# Patient Record
Sex: MALE | Race: WHITE | NOT HISPANIC OR LATINO | Employment: UNEMPLOYED | URBAN - METROPOLITAN AREA
[De-identification: names, ages, dates, MRNs, and addresses within clinical notes are randomized per-mention and may not be internally consistent; named-entity substitution may affect disease eponyms.]

---

## 2017-01-18 DIAGNOSIS — F64.0 GENDER DYSPHORIA IN ADULT: Primary | ICD-10-CM

## 2017-01-18 RX ORDER — CEFAZOLIN SODIUM 1 G/3ML
1 INJECTION, POWDER, FOR SOLUTION INTRAMUSCULAR; INTRAVENOUS SEE ADMIN INSTRUCTIONS
Status: CANCELLED | OUTPATIENT
Start: 2017-01-18

## 2017-01-19 ENCOUNTER — PRE VISIT (OUTPATIENT)
Dept: UROLOGY | Facility: CLINIC | Age: 27
End: 2017-01-19

## 2017-01-19 NOTE — TELEPHONE ENCOUNTER
Patient coming in for orchiectomy consult per appointment notes. Left message with patient to call clinic to confirm

## 2017-01-26 ENCOUNTER — ALLIED HEALTH/NURSE VISIT (OUTPATIENT)
Dept: UROLOGY | Facility: CLINIC | Age: 27
End: 2017-01-26

## 2017-01-26 ENCOUNTER — OFFICE VISIT (OUTPATIENT)
Dept: UROLOGY | Facility: CLINIC | Age: 27
End: 2017-01-26

## 2017-01-26 VITALS
HEIGHT: 72 IN | WEIGHT: 153.2 LBS | BODY MASS INDEX: 20.75 KG/M2 | HEART RATE: 67 BPM | DIASTOLIC BLOOD PRESSURE: 66 MMHG | SYSTOLIC BLOOD PRESSURE: 118 MMHG

## 2017-01-26 DIAGNOSIS — F64.0 GENDER DYSPHORIA IN ADOLESCENT AND ADULT: Primary | ICD-10-CM

## 2017-01-26 RX ORDER — CEFAZOLIN SODIUM 1 G/3ML
1 INJECTION, POWDER, FOR SOLUTION INTRAMUSCULAR; INTRAVENOUS SEE ADMIN INSTRUCTIONS
Status: CANCELLED | OUTPATIENT
Start: 2017-01-26

## 2017-01-26 ASSESSMENT — PAIN SCALES - GENERAL: PAINLEVEL: NO PAIN (0)

## 2017-01-26 NOTE — Clinical Note
"1/26/2017       RE: Rich Castro  [address] [place] [id number]     Dear Colleague,    Thank you for referring your patient, Rich Castro, to the [facility] UROLOGY AND [facility] FOR PROST[facility] AND UROLOGIC CANCERS at [facility]. Please see a copy of my visit note below.    It is our pleasure to see Rich \"Edilma\"Matthew back in Urology Clinic today.  She is a very nice 26-year-old male-to-female transgender here to discuss bilateral transgender orchiectomy for gender confirmation.      I last saw her in 10/2015.  She was initiating transitioning back then and when by Yvan.  Now she calls herself Edilma.  We have 2 letters in support of gender confirmation surgery, and we just need to get this procedure scheduled.        She has banked sperm prior to initiating hormone therapy.  She has been living as a female for well over a year and compliant with hormones for greater than 1 year's time as well.      PLAN:  We discussed the process of surgery, the incisions, the expected recovery and such.  All questions were answered to her satisfaction.  She is very excited to get this set up as soon as possible.  We will work on getting this arranged at her convenience.  She declines a scrotectomy.  She already has sperm banked.  I cannot think of any other tests or things that we would need to accomplish before scheduling the surgery.  We discussed with her that we will be supportive for her in getting this covered by insurance as well.      Total visit time today was 20 minutes, over half of that was face-to-face in counseling and discussion and discussing the protocol and procedure of transgender orchiectomies.           Again, thank you for allowing me to participate in the care of your patient.      Sincerely,    Alcides Martínez MD      "

## 2017-01-26 NOTE — PROGRESS NOTES
"It is our pleasure to see Rich \"Edilma\" Matthew back in Urology Clinic today.  She is a very nice 26-year-old male-to-female transgender here to discuss bilateral transgender orchiectomy for gender confirmation.      I last saw her in 10/2015.  She was initiating transitioning back then and when by Yvan.  Now she calls herself Edilma.  We have 2 letters in support of gender confirmation surgery, and we just need to get this procedure scheduled.        She has banked sperm prior to initiating hormone therapy.  She has been living as a female for well over a year and compliant with hormones for greater than 1 year's time as well.      PLAN:  We discussed the process of surgery, the incisions, the expected recovery and such.  All questions were answered to her satisfaction.  She is very excited to get this set up as soon as possible.  We will work on getting this arranged at her convenience.  She declines a scrotectomy.  She already has sperm banked.  I cannot think of any other tests or things that we would need to accomplish before scheduling the surgery.  We discussed with her that we will be supportive for her in getting this covered by insurance as well.      Total visit time today was 20 minutes, over half of that was face-to-face in counseling and discussion and discussing the protocol and procedure of transgender orchiectomies.       "

## 2017-01-26 NOTE — Clinical Note
Date:January 27, 2017      Provider requested that no letter be sent. Do not send.       [facility] Health Information

## 2017-01-27 NOTE — NURSING NOTE
Pre Op Teaching Flowsheet       Pre and Post op Patient Education  Relevant Diagnosis:  Bilateral transgender orchiectomy, subinguinal  Surgical procedure:  Male to female transgender  Teaching Topic:  Pre and post op teaching  Person Involved in teaching: Rich Castro    Motivation Level:  Asks Questions: Yes  Eager to Learn:  Yes  Cooperative: Yes  Receptive (willing/able to accept information):  Yes    Patient demonstrates understanding of the following:  Date of surgery:  3/7/17  Location of surgery:  [facility]- 5th Floor  History and Physical and any other testing necessary prior to surgery: Yes  Required time line for completion of History and Physical and any pre-op testing: Yes    Patient demonstrates understanding of the following:  Pre-op bowel prep:  N/A  Pre-op showering/scrub information with PCMX Soap: Yes  Blood thinner medications discussed and when to stop (if applicable):  N/A      Infection Prevention:   Patient demonstrates understanding of the following:  Surgical procedure site care taught: Yes  Signs and symptoms of infection taught:  Yes      Post-op follow-up:  Discussed how to contact the hospital, nurse, and clinic scheduling staff if necessary.    Instructional materials used/given/mailed:  [facility] Booklet, post op teaching sheet, Map, Soap, and arrival/location information.    Surgical instructions packet given to patient in office:  Yes.

## 2017-02-07 DIAGNOSIS — F64.0 GENDER IDENTITY DISORDER IN ADOLESCENTS OR ADULTS: Primary | ICD-10-CM

## 2017-02-07 RX ORDER — SPIRONOLACTONE 100 MG/1
200 TABLET, FILM COATED ORAL DAILY
Qty: 60 TABLET | Refills: 1 | Status: SHIPPED | OUTPATIENT
Start: 2017-02-07 | End: 2017-06-06

## 2017-02-09 ENCOUNTER — OFFICE VISIT (OUTPATIENT)
Dept: OTHER | Facility: OUTPATIENT CENTER | Age: 27
End: 2017-02-09

## 2017-02-09 DIAGNOSIS — F12.11 CANNABIS USE DISORDER, MILD, IN EARLY REMISSION: ICD-10-CM

## 2017-02-09 DIAGNOSIS — F64.0 GENDER DYSPHORIA IN ADOLESCENT AND ADULT: Primary | ICD-10-CM

## 2017-02-09 DIAGNOSIS — F41.1 GENERALIZED ANXIETY DISORDER: ICD-10-CM

## 2017-02-09 NOTE — PROGRESS NOTES
Program in [facility]  [facility]  [address], [address]  [place] [address]   Case Progress Note      Client Name: Rich Castro  Preferred Name: Edilma, Preferred pronouns: She, her  YOB: 1990  MRN: [id number]  Type of Session: Individual  Treating Provider: Harpreet Way Psy.D., LP  Present in Session: Client   Number of Minutes: 55  Date of Service: 2/09/17    Current Symptoms/Status:  The patient is a birth assigned.male who reports a history of gender dysphoria and persistent identification as female. She also endorsed current anxiety symptoms such as racing thoughts, fear, and chronic worry.  The client also endorsed daily marijuana use, which she stated that she has reduced in last week.  The client also regulalry exhibits deficits with processing speed, comprehension, focus, attention, and memory during sessions.     Progress Toward Treatment Goals:   Client reported positive interactions with peers at work.  Client has orchiectomy surgery scheduled.  Client has started laser hair removal.    Intervention: Modality and Description/ Response:  Utilized CBT, solution-focused, psychodynamic techniques to discuss gender dysphoria and related mental health symptoms.  Client has a date scheduled for her orchiectomy (March 7th with Dr. Mack).  Explored and identified client's feelings about this.  Discussed how client wants to handle this with work, as she has not yet taken time off or informed her boss that she won't be able to lift things for at least a week.  Identified a plan for assertively communicating about time off with her work.  Client indicated that she has mostly been working lately.  She reported that she has not been spending much time with others as a result.  Client stated that she does not want to come to  YA group anymore, as she works a lot and does not find group helpful.  Explored her difficulties with group and identified alternative ways for  client to receive trans* support.  Client reported that she still hasn't spoke with her father, who is continuing to avoid using any name or pronouns.  She reported that she still feels anxious about speaking with him.  Continued to discuss the potential benefits of client's father coming in for session.  Discussed some of client's concerns about her eating habits.  She stated that she doesn't have much of an appetite, as she often does not feel motivated to eat.  Explored how this may intersect with depressive symptoms or low mood.  Client reported that she has also been sleeping erratically.  Continued to discuss ways in which client could regulate her sleep cycle.    Identified some activities that client could do in her free time rather than sleep.   Client was more engaged than in previous sessions and exhibited improvement in tracking information.      Assignment:  Client will speak with HR at work about time off for surgery.  She will call a local drum shop to inquire about lessons.      Diagnosis:  302.85 - Gender Dysphoria in adolescents or adults  300. 02   - Generalized Anxiety Disorder  305.20- Cannabis Use Disorder, Mild in early remission    Interactive Complexity  None     Need for Future Services:  Return for therapy in 2 weeks.     Harpreet Way Psy.D., LP  Postdoctoral Fellow

## 2017-02-09 NOTE — PROGRESS NOTES
I did not personally see the patient.  I reviewed and agree with the assessment and plan as documented in this note. Jeannie Soares, PhD, LP

## 2017-03-06 ENCOUNTER — ANESTHESIA EVENT (OUTPATIENT)
Dept: SURGERY | Facility: AMBULATORY SURGERY CENTER | Age: 27
End: 2017-03-06

## 2017-03-07 ENCOUNTER — ANESTHESIA (OUTPATIENT)
Dept: SURGERY | Facility: AMBULATORY SURGERY CENTER | Age: 27
End: 2017-03-07

## 2017-03-07 ENCOUNTER — HOSPITAL ENCOUNTER (OUTPATIENT)
Facility: AMBULATORY SURGERY CENTER | Age: 27
End: 2017-03-07
Attending: UROLOGY

## 2017-03-07 VITALS
RESPIRATION RATE: 16 BRPM | SYSTOLIC BLOOD PRESSURE: 138 MMHG | OXYGEN SATURATION: 98 % | HEART RATE: 69 BPM | BODY MASS INDEX: 20.72 KG/M2 | HEIGHT: 72 IN | WEIGHT: 153 LBS | TEMPERATURE: 99.5 F | DIASTOLIC BLOOD PRESSURE: 76 MMHG

## 2017-03-07 DIAGNOSIS — Z90.79 S/P ORCHIECTOMY: Primary | ICD-10-CM

## 2017-03-07 DIAGNOSIS — F64.0 GENDER DYSPHORIA IN ADULT: ICD-10-CM

## 2017-03-07 RX ORDER — LIDOCAINE 40 MG/G
CREAM TOPICAL
Status: DISCONTINUED | OUTPATIENT
Start: 2017-03-07 | End: 2017-03-07 | Stop reason: HOSPADM

## 2017-03-07 RX ORDER — ONDANSETRON 2 MG/ML
INJECTION INTRAMUSCULAR; INTRAVENOUS PRN
Status: DISCONTINUED | OUTPATIENT
Start: 2017-03-07 | End: 2017-03-07

## 2017-03-07 RX ORDER — LIDOCAINE HYDROCHLORIDE 20 MG/ML
INJECTION, SOLUTION INFILTRATION; PERINEURAL PRN
Status: DISCONTINUED | OUTPATIENT
Start: 2017-03-07 | End: 2017-03-07

## 2017-03-07 RX ORDER — KETOROLAC TROMETHAMINE 30 MG/ML
30 INJECTION, SOLUTION INTRAMUSCULAR; INTRAVENOUS EVERY 6 HOURS PRN
Status: DISCONTINUED | OUTPATIENT
Start: 2017-03-07 | End: 2017-03-08 | Stop reason: HOSPADM

## 2017-03-07 RX ORDER — SODIUM CHLORIDE, SODIUM LACTATE, POTASSIUM CHLORIDE, CALCIUM CHLORIDE 600; 310; 30; 20 MG/100ML; MG/100ML; MG/100ML; MG/100ML
INJECTION, SOLUTION INTRAVENOUS CONTINUOUS
Status: DISCONTINUED | OUTPATIENT
Start: 2017-03-07 | End: 2017-03-08 | Stop reason: HOSPADM

## 2017-03-07 RX ORDER — NALOXONE HYDROCHLORIDE 0.4 MG/ML
.1-.4 INJECTION, SOLUTION INTRAMUSCULAR; INTRAVENOUS; SUBCUTANEOUS
Status: DISCONTINUED | OUTPATIENT
Start: 2017-03-07 | End: 2017-03-08 | Stop reason: HOSPADM

## 2017-03-07 RX ORDER — PROPOFOL 10 MG/ML
INJECTION, EMULSION INTRAVENOUS CONTINUOUS PRN
Status: DISCONTINUED | OUTPATIENT
Start: 2017-03-07 | End: 2017-03-07

## 2017-03-07 RX ORDER — ONDANSETRON 2 MG/ML
4 INJECTION INTRAMUSCULAR; INTRAVENOUS EVERY 30 MIN PRN
Status: DISCONTINUED | OUTPATIENT
Start: 2017-03-07 | End: 2017-03-08 | Stop reason: HOSPADM

## 2017-03-07 RX ORDER — DEXAMETHASONE SODIUM PHOSPHATE 4 MG/ML
INJECTION, SOLUTION INTRA-ARTICULAR; INTRALESIONAL; INTRAMUSCULAR; INTRAVENOUS; SOFT TISSUE PRN
Status: DISCONTINUED | OUTPATIENT
Start: 2017-03-07 | End: 2017-03-07

## 2017-03-07 RX ORDER — HYDROCODONE BITARTRATE AND ACETAMINOPHEN 5; 325 MG/1; MG/1
1-2 TABLET ORAL EVERY 4 HOURS PRN
Qty: 30 TABLET | Refills: 0 | Status: SHIPPED | OUTPATIENT
Start: 2017-03-07 | End: 2017-03-23

## 2017-03-07 RX ORDER — SODIUM CHLORIDE, SODIUM LACTATE, POTASSIUM CHLORIDE, CALCIUM CHLORIDE 600; 310; 30; 20 MG/100ML; MG/100ML; MG/100ML; MG/100ML
INJECTION, SOLUTION INTRAVENOUS CONTINUOUS PRN
Status: DISCONTINUED | OUTPATIENT
Start: 2017-03-07 | End: 2017-03-07

## 2017-03-07 RX ORDER — AMOXICILLIN 250 MG
1-2 CAPSULE ORAL 2 TIMES DAILY PRN
Qty: 20 TABLET | Refills: 0 | Status: SHIPPED | OUTPATIENT
Start: 2017-03-07 | End: 2017-03-23

## 2017-03-07 RX ORDER — FENTANYL CITRATE 50 UG/ML
25-50 INJECTION, SOLUTION INTRAMUSCULAR; INTRAVENOUS
Status: DISCONTINUED | OUTPATIENT
Start: 2017-03-07 | End: 2017-03-07 | Stop reason: HOSPADM

## 2017-03-07 RX ORDER — GABAPENTIN 300 MG/1
300 CAPSULE ORAL ONCE
Status: COMPLETED | OUTPATIENT
Start: 2017-03-07 | End: 2017-03-07

## 2017-03-07 RX ORDER — ONDANSETRON 4 MG/1
4 TABLET, ORALLY DISINTEGRATING ORAL EVERY 30 MIN PRN
Status: DISCONTINUED | OUTPATIENT
Start: 2017-03-07 | End: 2017-03-08 | Stop reason: HOSPADM

## 2017-03-07 RX ORDER — CEFAZOLIN SODIUM 1 G/3ML
1 INJECTION, POWDER, FOR SOLUTION INTRAMUSCULAR; INTRAVENOUS SEE ADMIN INSTRUCTIONS
Status: DISCONTINUED | OUTPATIENT
Start: 2017-03-07 | End: 2017-03-07 | Stop reason: HOSPADM

## 2017-03-07 RX ORDER — KETOROLAC TROMETHAMINE 30 MG/ML
INJECTION, SOLUTION INTRAMUSCULAR; INTRAVENOUS PRN
Status: DISCONTINUED | OUTPATIENT
Start: 2017-03-07 | End: 2017-03-07

## 2017-03-07 RX ORDER — MEPERIDINE HYDROCHLORIDE 25 MG/ML
12.5 INJECTION INTRAMUSCULAR; INTRAVENOUS; SUBCUTANEOUS
Status: DISCONTINUED | OUTPATIENT
Start: 2017-03-07 | End: 2017-03-08 | Stop reason: HOSPADM

## 2017-03-07 RX ORDER — ACETAMINOPHEN 325 MG/1
975 TABLET ORAL ONCE
Status: COMPLETED | OUTPATIENT
Start: 2017-03-07 | End: 2017-03-07

## 2017-03-07 RX ORDER — PROPOFOL 10 MG/ML
INJECTION, EMULSION INTRAVENOUS PRN
Status: DISCONTINUED | OUTPATIENT
Start: 2017-03-07 | End: 2017-03-07

## 2017-03-07 RX ORDER — HYDROCODONE BITARTRATE AND ACETAMINOPHEN 10; 325 MG/1; MG/1
1-2 TABLET ORAL ONCE
Status: DISCONTINUED | OUTPATIENT
Start: 2017-03-07 | End: 2017-03-08 | Stop reason: HOSPADM

## 2017-03-07 RX ORDER — BUPIVACAINE HYDROCHLORIDE 5 MG/ML
INJECTION, SOLUTION PERINEURAL PRN
Status: DISCONTINUED | OUTPATIENT
Start: 2017-03-07 | End: 2017-03-07 | Stop reason: HOSPADM

## 2017-03-07 RX ORDER — SODIUM CHLORIDE, SODIUM LACTATE, POTASSIUM CHLORIDE, CALCIUM CHLORIDE 600; 310; 30; 20 MG/100ML; MG/100ML; MG/100ML; MG/100ML
INJECTION, SOLUTION INTRAVENOUS CONTINUOUS
Status: DISCONTINUED | OUTPATIENT
Start: 2017-03-07 | End: 2017-03-07 | Stop reason: HOSPADM

## 2017-03-07 RX ADMIN — PROPOFOL 50 MG: 10 INJECTION, EMULSION INTRAVENOUS at 11:45

## 2017-03-07 RX ADMIN — ACETAMINOPHEN 975 MG: 325 TABLET ORAL at 08:21

## 2017-03-07 RX ADMIN — MEPERIDINE HYDROCHLORIDE 12.5 MG: 25 INJECTION INTRAMUSCULAR; INTRAVENOUS; SUBCUTANEOUS at 13:36

## 2017-03-07 RX ADMIN — DEXAMETHASONE SODIUM PHOSPHATE 6 MG: 4 INJECTION, SOLUTION INTRA-ARTICULAR; INTRALESIONAL; INTRAMUSCULAR; INTRAVENOUS; SOFT TISSUE at 11:28

## 2017-03-07 RX ADMIN — GABAPENTIN 300 MG: 300 CAPSULE ORAL at 08:21

## 2017-03-07 RX ADMIN — PROPOFOL 200 MCG/KG/MIN: 10 INJECTION, EMULSION INTRAVENOUS at 11:32

## 2017-03-07 RX ADMIN — ONDANSETRON 4 MG: 2 INJECTION INTRAMUSCULAR; INTRAVENOUS at 11:28

## 2017-03-07 RX ADMIN — LIDOCAINE HYDROCHLORIDE 80 MG: 20 INJECTION, SOLUTION INFILTRATION; PERINEURAL at 11:32

## 2017-03-07 RX ADMIN — PROPOFOL 50 MG: 10 INJECTION, EMULSION INTRAVENOUS at 11:39

## 2017-03-07 RX ADMIN — SODIUM CHLORIDE, SODIUM LACTATE, POTASSIUM CHLORIDE, CALCIUM CHLORIDE: 600; 310; 30; 20 INJECTION, SOLUTION INTRAVENOUS at 11:28

## 2017-03-07 RX ADMIN — KETOROLAC TROMETHAMINE 30 MG: 30 INJECTION, SOLUTION INTRAMUSCULAR; INTRAVENOUS at 12:31

## 2017-03-07 RX ADMIN — PROPOFOL 250 MCG/KG/MIN: 10 INJECTION, EMULSION INTRAVENOUS at 12:11

## 2017-03-07 RX ADMIN — PROPOFOL 250 MG: 10 INJECTION, EMULSION INTRAVENOUS at 11:32

## 2017-03-07 NOTE — OP NOTE
PREOPERATIVE DIAGNOSIS:  Gender dysphoria; Requested bilateral orchiectomy  POSTOPERATIVE DIAGNOSIS:  Same    PROCEDURES PERFORMED:   1.  Sub-inguinal bilateral orchiectomy    STAFF SURGEON:  Alcides Martínez MD, present for entire case.   RESIDENT(S):  Chemo Loredo MD  ANESTHESIA:  general    ESTIMATED BLOOD LOSS: 5 mL.   IV FLUIDS: see dictated anesthesia record  COMPLICATIONS: None.   SPECIMEN:    L testicle and spermatic cord up to the level of the external ring; R testicle and spermatic cord up to the level of the external ring;     SIGNIFICANT FINDINGS:   Ligation of the cords at the level approximately of the external ring; testicles excised     BRIEF OPERATIVE INDICATIONS: Rich Castro is a 26 year old year old year old male to female transgender individual who presented to clinic requested orchiectomy for the purpose of gender reassignment.  Patient provided 2 letters of support and had been living as a woman on hormone therapy for over one year.  In light of the foregoing, a decision was made to proceed with the noted procedure.     DESCRIPTION OF PROCEDURE: After full informed voluntary consent was obtained, the patient was transported to the operating room, placed supine on the table. After adequate anesthesia was induced, they were prepped and draped in the usual sterile fashion. A timeout was taken to confirm correct patient, procedure and laterality    We began the procedure started by marking 3 cm subinguinal incision bilaterally below the external ring. Injection of 0.5% Marcaine was used for local anesthesia bilaterally.  Initial incision performed on right side.  Electrocautery was used to carry dissection down to casey's fascia which was subsequently opened bluntly using the Metzenbaum scissors exposing the cord. Blunt dissection was continued proximally and distally to free up the cord and bring it up to the incision. The right testis was then delivered into the wound using blunt and  electrocautery dissection of the attachments to the scrotum, taking care to avoid any violation of the scrotal skin and the gubernaculum was completely dissected and divided.  Attention was then turned to the superior portion of the cord which was clamped in two portions and divided as high as possible at about the level of the external ring. Each side was stick tied with 0-silk suture, then a 0-Vicryl hand tie was placed below the stick tie. Hemostasis appeared excellent.      We then turned out attention to the left side and performed the procedure in the same manner as described above.  Once hemostasis was achieved on the left side we turned our attention to closing both incision sites.  Sheila's fascia was closed with a interrupoted 3-0 chromic sutures followed by the skin with running 4-0 monocryl suture in subcutaneous fashion. The wounds were dressed with benzoin and steri-strips then an island dressing.  Patient tolerated the procedure well. No apparent complications. He was transported to the postanesthesia care unit in stable condition        POST OPERATIVE PLAN: RTC in 2 weeks for wound check      I was present and scrubbed for the entire procedure.  Alcides Martínez MD  Urology Staff

## 2017-03-07 NOTE — DISCHARGE INSTRUCTIONS
[facility]  Home Care Following Anesthesia  For 24 hours after surgery:  1. Get plenty of rest.  A responsible adult must stay with you for at least 24 hours after you leave the surgery center.  2. Do not drive or use heavy equipment.  If you have weakness or tingling, don't drive or use heavy equipment until this feeling goes away.   3. Do not drink alcohol.   4. Avoid strenuous or risky activities.  Ask for help when climbing stairs.  5. You may feel lightheaded.  IF so, sit for a few minutes before standing.  Have someone help you get up.   6. If you have nausea (feel sick to your stomach): Drink only clear liquids such as apple juice, ginger ale, broth or 7-Up.  Rest may also help.  Be sure to drink enough fluids.  Move to a regular diet as you feel able.   7. You may have a slight fever.  Call the doctor if your fever is over 100 F (37.7 C) (taken under the tongue) or lasts longer than 24 hours.  8. You may have a dry mouth, a sore throat, muscle aches or trouble sleeping. These should go away after 24 hours.  9. Do not make important or legal decisions.               Tips for taking pain medications  To get the best pain relief possible, remember these points:    Take pain medications as directed, before pain becomes severe.    Pain medication can upset your stomach: taking it with food may help.    Constipation is a common side effect of pain medication. Drink plenty of  fluids.    Eat foods high in fiber. Take a stool softener if recommended by your doctor or pharmacist.    Do not drink alcohol, drive or operate machinery while taking pain medications.    Ask about other ways to control pain, such as with heat, ice or relaxation.    Call a doctor for any of the followin. Signs of infection (fever, growing tenderness at the surgery site, a large amount of drainage or bleeding, severe pain, foul-smelling drainage, redness, swelling).  2. It has been over 8 to 10 hours  since surgery and you are still not able to urinate (pass water).  3. Headache for over 24 hours.  4. Numbness, tingling or weakness the day after surgery (if you had spinal anesthesia).  Your doctor is:  Dr. Alcides Martínez, Prostate and Urology: [phone]                    Or dial [phone] and ask for the resident on call for:  Prostate Urology  For emergency care, call the:  [facility]:  [phone] (TTY for hearing impaired: [phone])

## 2017-03-07 NOTE — ANESTHESIA CARE TRANSFER NOTE
Patient: Rich Castro    Procedure(s):  Bilateral Transgender Orchiectomy, Subinguinal - Wound Class: I-Clean    Diagnosis: Male to Female Transgender  Diagnosis Additional Information: No value filed.    Anesthesia Type:   General, LMA     Note:  Airway :Room Air  Patient transferred to:PACU  Comments: Patient to PACU on RA/native airway and is somnolent. Report to RN and transfer of care. BP: 120/72 HR: 72 RR: 12 SpO2: 95% in RA Temp: 97.8      Vitals: (Last set prior to Anesthesia Care Transfer)    CRNA VITALS  3/7/2017 1216 - 3/7/2017 1250      3/7/2017             Pulse: 70    SpO2: 99 %    Resp Rate (observed): 16                Electronically Signed By: JENNA Blanc CRNA  March 7, 2017  12:50 PM

## 2017-03-07 NOTE — OR NURSING
1248-Pt from OR, sleeping RR 10-12 with jaw thrust. Oral airway placed- pt gaged on airway, pulled. Jaw thrust continued to keep pt from obstructive airway. 1315-Pt spontaneously breathing on FM with RR 14, BBS clear, lethargic but following simple commands. Cont to monitor.

## 2017-03-07 NOTE — LETTER
"    March 20, 2017       TO: Alysa Yu  [address] [place] [id number]       Dear ,    We are writing to inform you of your test results.    Your test results fall within the expected range(s) or remain unchanged from previous results.  Please continue with current treatment plan.    Resulted Orders   Surgical pathology exam   Result Value Ref Range    Copath Report       Patient Name: ALYSA YU  MR#: [id number]  Specimen #: [id number]  Collected: 3/7/2017  Received: 3/8/2017  Reported: 3/13/2017 18:22  Ordering Phy(s): OLGA CHAIREZ    For improved result formatting, select 'View Enhanced Report Format'  under Linked Documents section.    SPECIMEN(S):  A: Testis, right testicle  B: Testis, left testicle    FINAL DIAGNOSIS:  A. Testis, right, orchiectomy:  - Testis with atrophic changes  - Negative for malignancy    B. Testis, left, orchiectomy:  - Testis with atrophic changes  - Negative for malignancy    I have personally reviewed all specimens and or slides, including the  listed special stains, and used them with my medical judgement to  determine the final diagnosis.    Electronically signed out by:    Remington Myles M.D., [facility]    CLINICAL HISTORY:  26 year-old male to female transgender who has been on hormone therapy  for over one year.    GROSS:  A:  The specimen is received fresh with proper patient identification,  labeled \"right  testicle\".  The specimen consists of a 32.6 g orchiectomy  specimen to include testes (5.6 x 3.7 x 3.2 cm), spermatic cord (4.5 cm  in length and 1.3 cm in diameter), and epididymis (2.2 x 0.7 x 0.5 cm).  The outer surface is inked black, and the specimen is bisected to reveal  a yellow-brown stringy cut surface with no masses or lesions identified.   The tunica strips with ease from the cut surface.  Representative  sections are submitted.    Summary of Sections:  A1 - spermatic cord margin, en face  A2-A3 - representative testes    B:  The specimen " "is received fresh with proper patient identification,  labeled \"left testicle\".  The specimen consists of a 33.9 g orchiectomy  specimen to include testes (5.1 x 3.5 x 2.6 cm), spermatic cord (7.5 cm  in length x 1.5 cm in diameter), and epididymis (2.5 x 1.1 x 0.5 cm).  The outer surface is inked black, and the specimen is bisected to reveal  a red-brown stringy cut surface with no masses or lesions identified.  The tunica strips with ea se from the cut parenchyma.  Representative  sections are submitted.    Summary of Sections:  B1 - spermatic cord margin, en face  B2-B3 - representative testes  (Dictated by: Ashley Jensen 3/8/2017 09:53 AM)    MICROSCOPIC:  Microscopic examination was performed.    CPT Codes:  A: 92682-LH6  B: 42072-ZA1    TESTING LAB LOCATION:  [facility], [address]   [id number] [phone]    COLLECTION SITE:  Client: [facility]  Location: OR (B)         Alcides Martínez MD    "

## 2017-03-07 NOTE — IP AVS SNAPSHOT
MRN:[id number]                      After Visit Summary   3/7/2017    Rich Castro    MRN: [id number]           Thank you!     Thank you for choosing [facility] for your care. Our goal is always to provide you with excellent care. Hearing back from our patients is one way we can continue to improve our services. Please take a few minutes to complete the written survey that you may receive in the mail after you visit with us. Thank you!        Patient Information     Date Of Birth          1990        About your hospital stay     You were admitted on:  March 7, 2017 [facility]    You were discharged on:  March 7, 2017       Who to Call     For medical emergencies, please call 911.  For non-urgent questions about your medical care, please call your primary care provider or clinic, None  For questions related to your surgery, please call your surgery clinic        Attending Provider     Provider Specialty    Alcides Martínez MD Urology       Primary Care Provider    Abdullahi Pond MD       No address on file        After Care Instructions     Discharge Instructions       Activity  - No strenuous exercise for 2-3 weeks.  - No lifting, pushing, pulling more than 10 pounds for 3 weeks.   - Do not strain with bowel movements.  - Do not drive until you can press the brake pedal quickly and fully without pain.   - Do not operate a motor vehicle while taking narcotic pain medications.     Medications  - Transition from narcotic pain medications to tylenol (acetaminophen) as you are able.  Wean yourself off all pain medications as you are able.  - Some pain medications contain both tylenol (acetaminophen) and a narcotic (Norco, vicodin, percocet), do not take more than 4,000mg of Tylenol (acetaminophen) from all sources in any 24 hour period.  - Narcotics can make you constipated.  Take over the counter fiber (metamucil or benefiber) and stool  "softeners (miralax, docusate or senna) while taking narcotic pain medications, but stop if you develop diarrhea.  - No driving or operating machinery while taking narcotic pain medications     Incision:   -Keep incisions clean and dry  -You may shower as usual starting tomorrow however you must not submerge your incisions in water for at least 3 weeks (no baths, hot tubs, pools, etc)    Follow-Up:  - Call your primary care provider to touch base regarding your recent admission.    - Call or return sooner than your regularly scheduled visit if you develop any of the following: fever (greater than 101.5), uncontrolled pain, uncontrolled nausea or vomiting, as well as increased redness, swelling, or drainage from your wound.     Phone numbers:   - Monday through Friday 8am to 4:30pm: Call [phone] with questions or to schedule or confirm appointment.    - Nights or weekends: call the after hours emergency pager - [phone] and tell the  \"I would like to page the Urology Resident on call.\"  - For emergencies, call 911                  Your next 10 appointments already scheduled     Mar 08, 2017  4:30 PM CST   GROUP with [facility] ([facility])    [address]  Mail [address] 75[address]  [facility] [address]   [phone]            Mar 22, 2017  4:30 PM CDT   GROUP with [facility] ([facility])    [address]  [address]  [facility] [address]   [phone]            Mar 23, 2017  9:00 AM CDT   (Arrive by 8:45 AM)   Post-Op with Alcides Martínez MD   [facility] ([facility])    [address]  4th Floor  [facility] [address] [phone]            Mar 23, 2017  3:00 PM CDT   INDIVIDUAL THERAPY with Harpreet Way PsyD   [facility] ([facility])    [address]  Mail [address]  [facility] [address] "   [phone]            Apr 12, 2017  4:30 PM CDT   GROUP with  [facility] ([facility])    [address]  [address]  [facility] [address]   [phone]            Apr 26, 2017  4:30 PM CDT   GROUP with  [facility] ([facility])    [address]  [address]  [facility] [address]   [phone]              Further instructions from your care team       [facility]  Home Care Following Anesthesia  For 24 hours after surgery:  1. Get plenty of rest.  A responsible adult must stay with you for at least 24 hours after you leave the surgery center.  2. Do not drive or use heavy equipment.  If you have weakness or tingling, don't drive or use heavy equipment until this feeling goes away.   3. Do not drink alcohol.   4. Avoid strenuous or risky activities.  Ask for help when climbing stairs.  5. You may feel lightheaded.  IF so, sit for a few minutes before standing.  Have someone help you get up.   6. If you have nausea (feel sick to your stomach): Drink only clear liquids such as apple juice, ginger ale, broth or 7-Up.  Rest may also help.  Be sure to drink enough fluids.  Move to a regular diet as you feel able.   7. You may have a slight fever.  Call the doctor if your fever is over 100 F (37.7 C) (taken under the tongue) or lasts longer than 24 hours.  8. You may have a dry mouth, a sore throat, muscle aches or trouble sleeping. These should go away after 24 hours.  9. Do not make important or legal decisions.               Tips for taking pain medications  To get the best pain relief possible, remember these points:    Take pain medications as directed, before pain becomes severe.    Pain medication can upset your stomach: taking it with food may help.    Constipation is a common side effect of pain medication. Drink plenty of  fluids.    Eat foods high in fiber. Take a stool  softener if recommended by your doctor or pharmacist.    Do not drink alcohol, drive or operate machinery while taking pain medications.    Ask about other ways to control pain, such as with heat, ice or relaxation.    Call a doctor for any of the followin. Signs of infection (fever, growing tenderness at the surgery site, a large amount of drainage or bleeding, severe pain, foul-smelling drainage, redness, swelling).  2. It has been over 8 to 10 hours since surgery and you are still not able to urinate (pass water).  3. Headache for over 24 hours.  4. Numbness, tingling or weakness the day after surgery (if you had spinal anesthesia).  Your doctor is:  Dr. Alcides Martínez, Prostate and Urology: [phone]                    Or dial [phone] and ask for the resident on call for:  Prostate Urology  For emergency care, call the:  [facility]:  [phone] (TTY for hearing impaired: [phone])                Pending Results     No orders found from 3/5/2017 to 3/8/2017.            Admission Information     Date & Time Provider Department Dept. Phone    3/7/2017 Alcides Martínez MD [facility] [phone]      Your Vitals Were     Blood Pressure Pulse Temperature Respirations Height Weight    139/93 69 97.5  F (36.4  C) (Temporal) 14 1.829 m (6') 69.4 kg (153 lb)    Pulse Oximetry BMI (Body Mass Index)                100% 20.75 kg/m2          [employer] Information     [employer] gives you secure access to your electronic health record. If you see a primary care provider, you can also send messages to your care team and make appointments. If you have questions, please call your primary care clinic.  If you do not have a primary care provider, please call [phone] and they will assist you.      [employer] is an electronic gateway that provides easy, online access to your medical records. With [employer], you can request a clinic appointment, read your test results,  renew a prescription or communicate with your care team.     To access your existing account, please contact your [facility] or call [phone] for assistance.        Care EveryWhere ID     This is your Care EveryWhere ID. This could be used by other organizations to access your [place] medical records  [id number]           Review of your medicines      START taking        Dose / Directions    HYDROcodone-acetaminophen 5-325 MG per tablet   Commonly known as:  NORCO   Used for:  Gender dysphoria in adult, S/P orchiectomy        Dose:  1-2 tablet   Take 1-2 tablets by mouth every 4 hours as needed for moderate to severe pain (Moderate to Severe Pain)   Quantity:  30 tablet   Refills:  0       senna-docusate 8.6-50 MG per tablet   Commonly known as:  SENOKOT-S;PERICOLACE   Used for:  S/P orchiectomy        Dose:  1-2 tablet   Take 1-2 tablets by mouth 2 times daily as needed for constipation To prevent constipation while taking narcotic pain medication. Start with 1 tablet twice daily. If no bowel movement in 24 hours, increase to 2 tablets twice daily.  Discontinue if you have loose stools or when you are no longer taking narcotics.   Quantity:  20 tablet   Refills:  0         CONTINUE these medicines which have NOT CHANGED        Dose / Directions    estradiol 0.1 MG/24HR BIW patch   Commonly known as:  VIVELLE-DOT   Used for:  Gender dysphoria in adolescent and adult, Gender identity disorder in adolescents or adults        Dose:  2 patch   Place 2 patches onto the skin twice a week   Quantity:  16 patch   Refills:  5       Multi-vitamin Tabs tablet   Used for:  Gender identity disorder in adolescents or adults, Gender dysphoria in adolescent and adult        Dose:  1 tablet   Take 1 tablet by mouth daily   Refills:  0       progesterone 200 MG capsule   Commonly known as:  PROMETRIUM   Used for:  Gender dysphoria in adolescent and adult, Gender identity disorder in adolescents  or adults        Dose:  200 mg   Take 1 capsule (200 mg) by mouth daily   Quantity:  30 capsule   Refills:  3       spironolactone 100 MG tablet   Commonly known as:  ALDACTONE   Used for:  Gender identity disorder in adolescents or adults        Dose:  200 mg   Take 2 tablets (200 mg) by mouth daily   Quantity:  60 tablet   Refills:  1            Where to get your medicines      Some of these will need a paper prescription and others can be bought over the counter. Ask your nurse if you have questions.     Bring a paper prescription for each of these medications     HYDROcodone-acetaminophen 5-325 MG per tablet    senna-docusate 8.6-50 MG per tablet                Protect others around you: Learn how to safely use, store and throw away your medicines at www.disposemymeds.org.             Medication List: This is a list of all your medications and when to take them. Check marks below indicate your daily home schedule. Keep this list as a reference.      Medications           Morning Afternoon Evening Bedtime As Needed    estradiol 0.1 MG/24HR BIW patch   Commonly known as:  VIVELLE-DOT   Place 2 patches onto the skin twice a week                                HYDROcodone-acetaminophen 5-325 MG per tablet   Commonly known as:  NORCO   Take 1-2 tablets by mouth every 4 hours as needed for moderate to severe pain (Moderate to Severe Pain)                                Multi-vitamin Tabs tablet   Take 1 tablet by mouth daily                                progesterone 200 MG capsule   Commonly known as:  PROMETRIUM   Take 1 capsule (200 mg) by mouth daily                                senna-docusate 8.6-50 MG per tablet   Commonly known as:  SENOKOT-S;PERICOLACE   Take 1-2 tablets by mouth 2 times daily as needed for constipation To prevent constipation while taking narcotic pain medication. Start with 1 tablet twice daily. If no bowel movement in 24 hours, increase to 2 tablets twice daily.  Discontinue if you have  loose stools or when you are no longer taking narcotics.                                spironolactone 100 MG tablet   Commonly known as:  ALDACTONE   Take 2 tablets (200 mg) by mouth daily

## 2017-03-07 NOTE — BRIEF OP NOTE
Brief Operative Note    Pre-operative diagnosis: Male to Female Transgender   Post-operative diagnosis Same s/p sub-inguinal orchiectomy   Procedure: Procedure(s):  Bilateral Transgender Orchiectomy, Subinguinal - Wound Class: I-Clean   Surgeon:  MARIS Martínez MD   Assistants(s): BOSTON Loredo MD   Estimated blood loss: Minimal    Specimens: R testicle and cord; L testicle and cord   Findings: No unexpected findings noted

## 2017-03-07 NOTE — ANESTHESIA PREPROCEDURE EVALUATION
Anesthesia Evaluation     . Pt has not had prior anesthetic       ROS/MED HX    ENT/Pulmonary:  - neg pulmonary ROS     Neurologic:  - neg neurologic ROS     Cardiovascular:  - neg cardiovascular ROS   (+) ----. : . . . :. . No previous cardiac testing       METS/Exercise Tolerance:  >4 METS   Hematologic:  - neg hematologic  ROS       Musculoskeletal:  - neg musculoskeletal ROS       GI/Hepatic:  - neg GI/hepatic ROS       Renal/Genitourinary:  - ROS Renal section negative       Endo:  - neg endo ROS       Psychiatric:     (+) psychiatric history Psychiatric Hx List: gender dysphoria, substance abuse.      Infectious Disease:  - neg infectious disease ROS       Malignancy:      - no malignancy   Other:    - neg other ROS           Physical Exam  Normal systems: pulmonary and dental    Airway   Mallampati: I  TM distance: >3 FB  Neck ROM: full    Dental     Cardiovascular   Rhythm and rate: regular and normal      Pulmonary         ANESTHESIA PREOP EVALUATION    Procedure: Procedure(s):  Bilateral Transgender Orchiectomy, Subinguinal - Wound Class: I-Clean    HPI: Rich Castro is a 26 year old male who is presenting for above stated procedure.    PMHx/PSHx/ROS:  History reviewed. No pertinent past medical history.    History reviewed. No pertinent past surgical history.    ROS as stated above    Soc Hx:   Social History   Substance Use Topics     Smoking status: Never Smoker     Smokeless tobacco: Not on file     Alcohol use No       Allergies: No Known Allergies    Meds:     (Not in a hospital admission)    Current Outpatient Prescriptions   Medication Sig Dispense Refill     spironolactone (ALDACTONE) 100 MG tablet Take 2 tablets (200 mg) by mouth daily 60 tablet 1     progesterone (PROMETRIUM) 200 MG capsule Take 1 capsule (200 mg) by mouth daily 30 capsule 3     estradiol (VIVELLE-DOT) 0.1 MG/24HR patch Place 2 patches onto the skin twice a week 16 patch 5     multivitamin, therapeutic with minerals  (MULTI-VITAMIN) TABS Take 1 tablet by mouth daily         Physical Exam:  VS: Temp:  [36.8  C (98.2  F)] 36.8  C (98.2  F)  Pulse:  [69] 69  Resp:  [18] 18  BP: (128)/(72) 128/72  SpO2:  [97 %] 97 %   97%, Weight   Wt Readings from Last 2 Encounters:   03/07/17 69.4 kg (153 lb)   01/26/17 69.5 kg (153 lb 3.2 oz)       Labs:    BMP:  Recent Labs   Lab Test  06/21/16   1334  02/25/16   1102   NA   --   137.0   POTASSIUM   --   4.3   CHLORIDE   --   102.0   CO2   --   28.0   BUN   --   16.0   CR   --   1.1   GLC  97.0  90.0   SHALOM   --   8.8     LFTs:   Recent Labs   Lab Test  06/21/16   1334  11/26/14   0900   PROTTOTAL   --   7.3   ALBUMIN   --   4.2   BILITOTAL   --   0.7   ALKPHOS   --   61.0   AST  17.0  17.0   ALT  11.0  15.0     CBC:   No results for input(s): WBC, RBC, HGB, HCT, MCV, MCH, MCHC, RDW, PLT in the last 93216 hours.  Coags:  No results for input(s): INR, PTT, FIBR in the last 84660 hours.                      Anesthesia Plan      History & Physical Review  History and physical reviewed and following examination; no interval change.    ASA Status:  1 .    NPO Status:  > 8 hours    Plan for General and LMA with Intravenous induction. Maintenance will be Balanced.    PONV prophylaxis:  Ondansetron (or other 5HT-3) and Dexamethasone or Solumedrol       Postoperative Care  Postoperative pain management:  Multi-modal analgesia.      Consents  Anesthetic plan, risks, benefits and alternatives discussed with: .  Use of blood products discussed: No .   .     Patient discussed with Staff Anesthesiologist.    Kuldip Correa  Anesthesia Resident CA-3  Pager [phone]  March 7, 2017, 9:13 AM                    .

## 2017-03-07 NOTE — ANESTHESIA POSTPROCEDURE EVALUATION
Patient: Rich Castro    Procedure(s):  Bilateral Transgender Orchiectomy, Subinguinal - Wound Class: I-Clean    Diagnosis:Male to Female Transgender  Diagnosis Additional Information: No value filed.    Anesthesia Type:  General, LMA    Note:  Anesthesia Post Evaluation    Patient location during evaluation: PACU  Patient participation: Able to fully participate in evaluation  Level of consciousness: awake  Pain management: adequate  Airway patency: patent  Cardiovascular status: acceptable  Respiratory status: acceptable  Hydration status: balanced  PONV: none     Anesthetic complications: None          Last vitals:  Vitals:    03/07/17 0756   BP: 128/72   Pulse: 69   Resp: 18   Temp: 36.8  C (98.2  F)   SpO2: 97%         Electronically Signed By: Paul Herrera MD  March 7, 2017  1:07 PM

## 2017-03-07 NOTE — IP AVS SNAPSHOT
[facility]    [address] [place] [address]    Phone:  [phone]    Fax:  [phone]                                       After Visit Summary   3/7/2017    Rich Castro    MRN: [id number]           After Visit Summary Signature Page     I have received my discharge instructions, and my questions have been answered. I have discussed any challenges I see with this plan with the nurse or doctor.    ..........................................................................................................................................  Patient/Patient Representative Signature      ..........................................................................................................................................  Patient Representative Print Name and Relationship to Patient    ..................................................               ................................................  Date                                            Time    ..........................................................................................................................................  Reviewed by Signature/Title    ...................................................              ..............................................  Date                                                            Time

## 2017-03-10 ENCOUNTER — CARE COORDINATION (OUTPATIENT)
Dept: UROLOGY | Facility: CLINIC | Age: 27
End: 2017-03-10

## 2017-03-13 LAB — COPATH REPORT: NORMAL

## 2017-03-17 ENCOUNTER — PRE VISIT (OUTPATIENT)
Dept: UROLOGY | Facility: CLINIC | Age: 27
End: 2017-03-17

## 2017-03-17 NOTE — TELEPHONE ENCOUNTER
Sub-inguinal bilateral orchiectomy 3/7/17 surgical follow up. No labs or imaging needed. Records in epic. No need to contact patient

## 2017-03-23 ENCOUNTER — OFFICE VISIT (OUTPATIENT)
Dept: OTHER | Facility: OUTPATIENT CENTER | Age: 27
End: 2017-03-23

## 2017-03-23 DIAGNOSIS — F64.0 GENDER DYSPHORIA IN ADOLESCENT AND ADULT: Primary | ICD-10-CM

## 2017-03-23 DIAGNOSIS — F41.1 GENERALIZED ANXIETY DISORDER: ICD-10-CM

## 2017-03-23 DIAGNOSIS — F12.11 CANNABIS USE DISORDER, MILD, IN EARLY REMISSION: ICD-10-CM

## 2017-03-23 NOTE — MR AVS SNAPSHOT
After Visit Summary   3/23/2017    Rich Castro    MRN: [id number]           Patient Information     Date Of Birth          1990        Visit Information        Provider Department      3/23/2017 3:00 PM Harpreet Way PsyD [facility]        Today's Diagnoses     Gender dysphoria in adolescent and adult    -  1    Generalized anxiety disorder        Cannabis use disorder, mild, in early remission           Follow-ups after your visit        Your next 10 appointments already scheduled     Apr 12, 2017  4:30 PM CDT   GROUP with [facility] ([facility])    [address]  Mail [address] 7521  [facility] [address]   [phone]            Apr 17, 2017  3:00 PM CDT   INDIVIDUAL THERAPY with Harpreet Way PsyD   [facility] ([facility])    [address]  Mail [address]  [facility] [address]   [phone]            Apr 26, 2017  4:30 PM CDT   GROUP with [facility] ([facility])    [address]  [address]  [facility] [address]   [phone]              Who to contact     Please call your clinic at [phone] to:    Ask questions about your health    Make or cancel appointments    Discuss your medicines    Learn about your test results    Speak to your doctor   If you have compliments or concerns about an experience at your clinic, or if you wish to file a complaint, please contact [facility] Patient Relations at [phone] or email us at [email or web address]         Additional Information About Your Visit        MyChart Information     [employer] gives you secure access to your electronic health record. If you see a primary care provider, you can also send messages to your care team and make appointments. If you have questions, please call your primary care clinic.  If you do not have a primary care provider, please call  [phone] and they will assist you.      [employer] is an electronic gateway that provides easy, online access to your medical records. With [employer], you can request a clinic appointment, read your test results, renew a prescription or communicate with your care team.     To access your existing account, please contact your [facility] or call [phone] for assistance.        Care EveryWhere ID     This is your Care EveryWhere ID. This could be used by other organizations to access your [place] medical records  [id number]         Blood Pressure from Last 3 Encounters:   03/23/17 124/73   03/07/17 138/76   01/26/17 118/66    Weight from Last 3 Encounters:   03/23/17 69.4 kg (153 lb)   03/07/17 69.4 kg (153 lb)   01/26/17 69.5 kg (153 lb 3.2 oz)              We Performed the Following     Individual Psychotherapy (16-37 min) [02215]        Primary Care Provider    Referred Salty, MD       No address on file        Thank you!     Thank you for choosing [facility]  for your care. Our goal is always to provide you with excellent care. Hearing back from our patients is one way we can continue to improve our services. Please take a few minutes to complete the written survey that you may receive in the mail after your visit with us. Thank you!             Your Updated Medication List - Protect others around you: Learn how to safely use, store and throw away your medicines at www.disposemymeds.org.          This list is accurate as of: 3/23/17 11:59 PM.  Always use your most recent med list.                   Brand Name Dispense Instructions for use    estradiol 0.1 MG/24HR BIW patch    VIVELLE-DOT    16 patch    Place 2 patches onto the skin twice a week       Multi-vitamin Tabs tablet      Take 1 tablet by mouth daily       progesterone 200 MG capsule    PROMETRIUM    30 capsule    Take 1 capsule (200 mg) by mouth daily       spironolactone 100 MG tablet    ALDACTONE    60  tablet    Take 2 tablets (200 mg) by mouth daily

## 2017-03-23 NOTE — PROGRESS NOTES
[facility]  [facility]  [address], [address]  [place] [address]   Case Progress Note      Client Name: Rich Castro  Preferred Name: Edilma, Preferred pronouns: She, her  YOB: 1990  MRN: [id number]  Type of Session: Individual  Treating Provider: Harpreet Way Psy.D., LP  Present in Session: Client   Number of Minutes: 30  Treatment Plan Due: 3/23/18  Date of Service: 3/23/17    Current Symptoms/Status:  The patient is a birth assigned.male who reports a history of gender dysphoria and persistent identification as female. She also endorsed current anxiety symptoms such as racing thoughts, fear, and chronic worry.  The client also endorsed daily marijuana use, which she stated that she has reduced significantly over last nine months.      Progress Toward Treatment Goals:   Client had an orchiectomy on first week of March 2017.      Intervention: Modality and Description/ Response:  Utilized CBT, solution-focused, psychodynamic techniques to discuss gender dysphoria and related mental health symptoms. Client was 30 min late for appointment, as she got held up at previous appointment.  She called to notify this writer that she would be late.  Discussed client's experience of having an orchiectomy a couple weeks ago.  She reported that she has noticed an increased comfort with her body, which has helped improve mood.  She reported that she had her follow-up appointment today and is cleared to return to work in a week.  Continued to discuss client's desire to speak with her father about her gender transition, as he's avoided the topic since she came out to him and continues to avoid using a name or pronouns when speaking about her.  Explored and identified her feelings about this.  Identified ongoing barriers to client speaking with her father, including fears of rejection.  Client reported that she came out to someone in HR to get her surgery and also came out to  a couple co-workers.  Reinforced her taking these risks.  Discussed her goals for coming out at work and developed a potential plan for doing so, which will include her working with HR to have their support.  She reported feeling positive about it, as the co-workers she has told have been very supportive and open.  Updated client's treatment plan, which client signed. Client was engaged and in an upbeat mood.     Assignment:  She will write a letter to her father to aid in organizing her thoughts and to facilitate assertive communication.   She will speak with HR about her desire to come out formally at work.     Diagnosis:  302.85 - Gender Dysphoria in adolescents or adults  300. 02   - Generalized Anxiety Disorder  305.20- Cannabis Use Disorder, Mild in partial remission    Interactive Complexity  None     Need for Future Services:  Return for therapy in 2 weeks.     Harpreet Way Psy.D., HALLIE  Postdoctoral Fellow

## 2017-04-05 NOTE — PROGRESS NOTES
I did not personally see the patient but I have reviewed and agree with the assessment and plan as documented in this note.  Beronica Moss, PhD -- Supervisor   Licensed Psychologist

## 2017-06-01 ENCOUNTER — TELEPHONE (OUTPATIENT)
Dept: OTHER | Facility: OUTPATIENT CENTER | Age: 27
End: 2017-06-01

## 2017-06-01 DIAGNOSIS — F64.0 GENDER DYSPHORIA IN ADOLESCENT AND ADULT: ICD-10-CM

## 2017-06-01 DIAGNOSIS — F64.0 GENDER IDENTITY DISORDER IN ADOLESCENTS OR ADULTS: ICD-10-CM

## 2017-06-02 NOTE — TELEPHONE ENCOUNTER
Contacted pharmacy that is filling the RX. Requested that the Pharmacy advise the patient that further refills will require an office visit.     Elias Shoemaker RN

## 2017-06-02 NOTE — TELEPHONE ENCOUNTER
Called Edilma to let her know that the RX for progesterone (PROMETRIUM) 200 MG capsule was refilled for 1 month and the further refills will require an office visit. No answer, message left requesting a call back.     Elias Shoemaker RN

## 2017-06-06 ENCOUNTER — OFFICE VISIT (OUTPATIENT)
Dept: OTHER | Facility: OUTPATIENT CENTER | Age: 27
End: 2017-06-06

## 2017-06-06 VITALS
HEART RATE: 83 BPM | DIASTOLIC BLOOD PRESSURE: 80 MMHG | SYSTOLIC BLOOD PRESSURE: 126 MMHG | WEIGHT: 157 LBS | BODY MASS INDEX: 21.26 KG/M2 | HEIGHT: 72 IN

## 2017-06-06 DIAGNOSIS — F64.0 GENDER DYSPHORIA IN ADOLESCENT AND ADULT: ICD-10-CM

## 2017-06-06 DIAGNOSIS — F64.0 GENDER IDENTITY DISORDER IN ADOLESCENTS OR ADULTS: ICD-10-CM

## 2017-06-06 RX ORDER — ESTRADIOL 0.1 MG/D
1 FILM, EXTENDED RELEASE TRANSDERMAL
Qty: 8 PATCH | Refills: 5 | Status: SHIPPED | OUTPATIENT
Start: 2017-06-08

## 2017-06-06 ASSESSMENT — ENCOUNTER SYMPTOMS
NERVOUS/ANXIOUS: 1
SHORTNESS OF BREATH: 0
WEAKNESS: 0
CHEST TIGHTNESS: 0
ABDOMINAL PAIN: 0
CHILLS: 0
UNEXPECTED WEIGHT CHANGE: 0
VOMITING: 0
FREQUENCY: 0
FEVER: 0
DYSPHORIC MOOD: 0
PALPITATIONS: 0
NUMBNESS: 0
HEADACHES: 0
LIGHT-HEADEDNESS: 0
POLYDIPSIA: 0

## 2017-06-06 ASSESSMENT — ANXIETY QUESTIONNAIRES
IF YOU CHECKED OFF ANY PROBLEMS ON THIS QUESTIONNAIRE, HOW DIFFICULT HAVE THESE PROBLEMS MADE IT FOR YOU TO DO YOUR WORK, TAKE CARE OF THINGS AT HOME, OR GET ALONG WITH OTHER PEOPLE: SOMEWHAT DIFFICULT
7. FEELING AFRAID AS IF SOMETHING AWFUL MIGHT HAPPEN: SEVERAL DAYS
2. NOT BEING ABLE TO STOP OR CONTROL WORRYING: MORE THAN HALF THE DAYS
GAD7 TOTAL SCORE: 9
1. FEELING NERVOUS, ANXIOUS, OR ON EDGE: MORE THAN HALF THE DAYS
3. WORRYING TOO MUCH ABOUT DIFFERENT THINGS: SEVERAL DAYS
6. BECOMING EASILY ANNOYED OR IRRITABLE: SEVERAL DAYS
5. BEING SO RESTLESS THAT IT IS HARD TO SIT STILL: SEVERAL DAYS

## 2017-06-06 ASSESSMENT — PATIENT HEALTH QUESTIONNAIRE - PHQ9: 5. POOR APPETITE OR OVEREATING: SEVERAL DAYS

## 2017-06-06 NOTE — PROGRESS NOTES
JANET France is a 26 year old individual that uses pronouns She/Her/Hers/Herself that presents today for follow up of:  feminizing hormone therapy.   Gender identity: female.   Started Hormone  therapy  9/2015  Continues on Vivelle dot 0.1 mg patch 2x/wk    Other *Prometrium 200 mg daily    Any special concerns today?  Had orchiectomy on 3/7/2017 with Dr. Martínez, no complications. Since then, has reduced to Vivelle dot 0.1 mg patch (1 patch) 2x/wk, d/c'd spironolactone but continued prometrium.     Anxiety:   Social anxiety, especialy large groups, tend to freeze up, hyperventilate, no chest pain. She usually encounters this  3-4x/wk, not usually a problem in work setting, has tried friend's Xanax to cope and would like to try this  No prior SSRI, has used marijuana to cope  GAD7= 9, PHQ =14  Irregular sleep pattern, Variable appetite Lack of motivation    She was recently fired from work  [place] bipolar runs in family, but don't know who, not immediate family  No past clear symptoms manic episodes      Marijuana: had been taking break, but resumed in last week 1 blunt, shared,   ETOH--none  Tobacco--3 small cigars last 2 weeks  Cocaine--2x's/last week      On hormones?  YES +++ Shot day of the week? Not applicable-taking pills/patch/gel      Due for labs?  No      +++ Refills of meds needed?  Yes  Gender related body changes since last visit: none    Activity: beach  New health concerns since last visit: no health concerns  ---    Past Surgical History:   Procedure Laterality Date     ORCHIECTOMY INGUINAL BILATERAL Bilateral 3/7/2017    Procedure: ORCHIECTOMY INGUINAL BILATERAL;  Surgeon: Alcides Martínez MD;  Location:  OR       Patient Active Problem List   Diagnosis     Social phobia     Generalized anxiety disorder     Marijuana abuse, continuous     Alcohol abuse     Gender dysphoria in adolescent and adult     Cannabis use disorder, mild, abuse     Cannabis use disorder, mild, in early remission        Current Outpatient Prescriptions   Medication Sig Dispense Refill     progesterone (PROMETRIUM) 200 MG capsule Take 1 capsule (200 mg) by mouth daily 30 capsule 0     spironolactone (ALDACTONE) 100 MG tablet Take 2 tablets (200 mg) by mouth daily 60 tablet 1     multivitamin, therapeutic with minerals (MULTI-VITAMIN) TABS Take 1 tablet by mouth daily       estradiol (VIVELLE-DOT) 0.1 MG/24HR patch Place 2 patches onto the skin twice a week 16 patch 5       History   Smoking Status     Light Tobacco Smoker   Smokeless Tobacco     Not on file        No Known Allergies    Health Maintenance Due   Topic Date Due     TETANUS IMMUNIZATION (SYSTEM ASSIGNED)  12/31/2009         Problem, Medication and Allergy Lists were reviewed and are current..         Review of Systems:   Review of Systems   Constitutional: Negative for chills, fever and unexpected weight change.   Eyes: Negative for visual disturbance.   Respiratory: Negative for chest tightness and shortness of breath.    Cardiovascular: Negative for chest pain, palpitations and leg swelling.   Gastrointestinal: Negative for abdominal pain and vomiting.   Endocrine: Negative for polydipsia and polyuria.   Genitourinary: Negative for frequency.   Neurological: Negative for weakness, light-headedness, numbness and headaches.   Psychiatric/Behavioral: Negative for dysphoric mood and suicidal ideas. The patient is nervous/anxious.           Physical Exam:     Vitals:    06/06/17 1003   BP: 126/80   Pulse: 83   Weight: 71.2 kg (157 lb)   Height: 1.829 m (6')     BMI= Body mass index is 21.29 kg/(m^2).   Wt Readings from Last 10 Encounters:   06/06/17 71.2 kg (157 lb)   03/23/17 69.4 kg (153 lb)   03/07/17 69.4 kg (153 lb)   01/26/17 69.5 kg (153 lb 3.2 oz)   11/28/16 69.4 kg (153 lb)   08/29/16 73 kg (161 lb)   04/28/16 70.3 kg (155 lb)   02/29/16 71.2 kg (157 lb)   12/23/15 72.1 kg (159 lb)   11/23/15 73.9 kg (163 lb)     Appearance: Female appearance and  "dress    GENERAL:: healthy, alert and no distress  RESP: lungs clear to auscultation - no rales, no rhonchi, no wheezes  CV: regular rates and rhythm, normal S1 S2, no S3 or S4 and no murmur, no click or rub -  Breasts per flow sheet      Affect: flat , somewhat incongruent.     Speech RRR, mood is anxious, no psychomotor changes, thought process is appropriate,. No evidence of suicidality. :           Labs:   Results from last visit:  Hospital Outpatient Visit on 03/07/2017   Component Date Value Ref Range Status     Copath Report 03/07/2017    Final                    Value:Patient Name: ALYSA YU  MR#: [id number]  Specimen #: [id number]  Collected: 3/7/2017  Received: 3/8/2017  Reported: 3/13/2017 18:22  Ordering Phy(s): OLGA CAHIREZ    For improved result formatting, select 'View Enhanced Report Format'  under Linked Documents section.    SPECIMEN(S):  A: Testis, right testicle  B: Testis, left testicle    FINAL DIAGNOSIS:  A. Testis, right, orchiectomy:  - Testis with atrophic changes  - Negative for malignancy    B. Testis, left, orchiectomy:  - Testis with atrophic changes  - Negative for malignancy    I have personally reviewed all specimens and or slides, including the  listed special stains, and used them with my medical judgement to  determine the final diagnosis.    Electronically signed out by:    Remington Myles M.D., [facility]    CLINICAL HISTORY:  26 year-old male to female transgender who has been on hormone therapy  for over one year.    GROSS:  A:  The specimen is received fresh with proper patient identification,  labeled \"right                           testicle\".  The specimen consists of a 32.6 g orchiectomy  specimen to include testes (5.6 x 3.7 x 3.2 cm), spermatic cord (4.5 cm  in length and 1.3 cm in diameter), and epididymis (2.2 x 0.7 x 0.5 cm).  The outer surface is inked black, and the specimen is bisected to reveal  a yellow-brown stringy cut surface with no masses or lesions " "identified.   The tunica strips with ease from the cut surface.  Representative  sections are submitted.    Summary of Sections:  A1 - spermatic cord margin, en face  A2-A3 - representative testes    B:  The specimen is received fresh with proper patient identification,  labeled \"left testicle\".  The specimen consists of a 33.9 g orchiectomy  specimen to include testes (5.1 x 3.5 x 2.6 cm), spermatic cord (7.5 cm  in length x 1.5 cm in diameter), and epididymis (2.5 x 1.1 x 0.5 cm).  The outer surface is inked black, and the specimen is bisected to reveal  a red-brown stringy cut surface with no masses or lesions identified.  The tunica strips with ea                          se from the cut parenchyma.  Representative  sections are submitted.    Summary of Sections:  B1 - spermatic cord margin, en face  B2-B3 - representative testes  (Dictated by: Ashley Jensen 3/8/2017 09:53 AM)    MICROSCOPIC:  Microscopic examination was performed.    CPT Codes:  A: 25097-EZ9  B: 18773-GZ7    TESTING LAB LOCATION:  [facility], [address]   [address]  [phone]    COLLECTION SITE:  Client: [facility]  Location: [facility] ([place])       Did not do labs from prior office visit with me    Assessment and Plan   1. Gender dysphoria  2. Anxiety disorder, social anxiety  3. Substance abuse    No change in breast development despite 6 months of progestin.  Discussed that unlikely further use will add significant growth. D/c prometrium. Given patient's ongoing smoking, recommend continue current TD estradiol,  To check lipids and CMP    Discussed at length with patient the nature and treatment of anxiety, including social anxiety. Discussed use of substances to self medicate, Xanax inappropriate for patient's frequent and ongoing symptoms, likelihood dependence. Recommend SSRI or similar daily medication.   Pt. Declines " "these, only wants \"as needed\" medication.    Follow up:  Follow up in 6 months.  [facility]  Questions were elicited and answered.     Franco Roque MD      "

## 2017-06-06 NOTE — MR AVS SNAPSHOT
After Visit Summary   6/6/2017    Rich Castro    MRN: [id number]           Patient Information     Date Of Birth          1990        Visit Information        Provider Department      6/6/2017 10:00 AM Franco Roque MD [facility]        Today's Diagnoses     Gender dysphoria in adolescent and adult        Gender identity disorder in adolescents or adults           Follow-ups after your visit        Follow-up notes from your care team     Return in about 6 months (around 12/6/2017).      Your next 10 appointments already scheduled     Aug 02, 2017  3:00 PM CDT   INDIVIDUAL THERAPY with Harpreet Way PsyD   [facility] ([facility])    [address]  [address]  [facility] [address]   [phone]              Who to contact     Please call your clinic at [phone] to:    Ask questions about your health    Make or cancel appointments    Discuss your medicines    Learn about your test results    Speak to your doctor   If you have compliments or concerns about an experience at your clinic, or if you wish to file a complaint, please contact [facility] Patient Relations at [phone] or email us at [email or web address]         Additional Information About Your Visit        MyChart Information     [employer] gives you secure access to your electronic health record. If you see a primary care provider, you can also send messages to your care team and make appointments. If you have questions, please call your primary care clinic.  If you do not have a primary care provider, please call [phone] and they will assist you.      [employer] is an electronic gateway that provides easy, online access to your medical records. With [employer], you can request a clinic appointment, read your test results, renew a prescription or communicate with your care team.     To access your existing account, please contact your  [facility] or call [phone] for assistance.        Care EveryWhere ID     This is your Care EveryWhere ID. This could be used by other organizations to access your [facility] medical records  [id number]        Your Vitals Were     Pulse Height BMI (Body Mass Index)             83 1.829 m (6') 21.29 kg/m2          Blood Pressure from Last 3 Encounters:   06/06/17 126/80   03/23/17 124/73   03/07/17 138/76    Weight from Last 3 Encounters:   06/06/17 71.2 kg (157 lb)   03/23/17 69.4 kg (153 lb)   03/07/17 69.4 kg (153 lb)              Today, you had the following     No orders found for display         Today's Medication Changes          These changes are accurate as of: 6/6/17 11:59 PM.  If you have any questions, ask your nurse or doctor.               These medicines have changed or have updated prescriptions.        Dose/Directions    estradiol 0.1 MG/24HR BIW patch   Commonly known as:  VIVELLE-DOT   This may have changed:  how much to take   Used for:  Gender dysphoria in adolescent and adult, Gender identity disorder in adolescents or adults   Changed by:  Franco Roque MD        Dose:  1 patch   Place 1 patch onto the skin twice a week   Quantity:  8 patch   Refills:  5         Stop taking these medicines if you haven't already. Please contact your care team if you have questions.     progesterone 200 MG capsule   Commonly known as:  PROMETRIUM   Stopped by:  Franco Roque MD                Where to get your medicines      These medications were sent to [id number] - [place], [place] - [address] AT [facility] [address]  [facility] [address], [place] [address]     Phone:  [phone]     estradiol 0.1 MG/24HR BIW patch                Primary Care Provider    Referred MD Salty       No address on file        Thank you!     Thank you for choosing [facility]  for your care. Our goal is always to provide you with  excellent care. Hearing back from our patients is one way we can continue to improve our services. Please take a few minutes to complete the written survey that you may receive in the mail after your visit with us. Thank you!             Your Updated Medication List - Protect others around you: Learn how to safely use, store and throw away your medicines at www.disposemymeds.org.          This list is accurate as of: 6/6/17 11:59 PM.  Always use your most recent med list.                   Brand Name Dispense Instructions for use    estradiol 0.1 MG/24HR BIW patch    VIVELLE-DOT    8 patch    Place 1 patch onto the skin twice a week       Multi-vitamin Tabs tablet      Take 1 tablet by mouth daily

## 2017-06-06 NOTE — NURSING NOTE
Chief Complaint   Patient presents with     RECHECK     TG / medication refill       Vitals:    06/06/17 1003   BP: 126/80   Pulse: 83   Weight: 71.2 kg (157 lb)   Height: 1.829 m (6')       Body mass index is 21.29 kg/(m^2).      Ondina Ontiveros CMA    No Current Primary Provider

## 2017-06-07 ASSESSMENT — PATIENT HEALTH QUESTIONNAIRE - PHQ9: SUM OF ALL RESPONSES TO PHQ QUESTIONS 1-9: 14

## 2017-06-07 ASSESSMENT — ANXIETY QUESTIONNAIRES: GAD7 TOTAL SCORE: 9

## 2017-09-05 ENCOUNTER — TELEPHONE (OUTPATIENT)
Dept: OTHER | Facility: OUTPATIENT CENTER | Age: 27
End: 2017-09-05

## 2017-09-05 NOTE — TELEPHONE ENCOUNTER
Spoke with client.  Client verified that it is her intention to [facility] with [facility].  Gave her information for scheduling.  Notified her that this writer will be unavailable for most of month of September and reminded her of emergency resources, including [facility] emergency line. She will continue to see Dr. Franco Roque at [facility].     Harpreet Way Psy.D., HALLIE  Postdoctoral Fellow

## 2018-02-23 DIAGNOSIS — Z01.89 ENCOUNTER FOR LABORATORY TEST: Primary | ICD-10-CM

## 2018-04-20 ENCOUNTER — MEDICAL CORRESPONDENCE (OUTPATIENT)
Dept: HEALTH INFORMATION MANAGEMENT | Facility: CLINIC | Age: 28
End: 2018-04-20

## 2018-04-20 LAB — ESTRADIOL SERPL-MCNC: 162 PG/ML (ref 6–50)

## 2018-04-24 LAB
SHBG SERPL-SCNC: 53 NMOL/L (ref 11–80)
TESTOST FREE SERPL-MCNC: 0.04 NG/DL (ref 4.7–24.4)
TESTOST SERPL-MCNC: 4 NG/DL (ref 240–950)

## 2018-04-27 LAB — LAB SCANNED RESULT: ABNORMAL

## 2018-10-17 ENCOUNTER — MEDICAL CORRESPONDENCE (OUTPATIENT)
Dept: HEALTH INFORMATION MANAGEMENT | Facility: CLINIC | Age: 28
End: 2018-10-17

## 2018-12-12 DIAGNOSIS — F64.0 GENDER DYSPHORIA IN ADOLESCENT AND ADULT: Primary | ICD-10-CM

## 2018-12-12 LAB — ESTRADIOL SERPL-MCNC: 359 PG/ML (ref 6–50)

## 2018-12-13 LAB
SHBG SERPL-SCNC: 105 NMOL/L (ref 11–80)
TESTOST FREE SERPL-MCNC: 0.09 NG/DL (ref 4.7–24.4)
TESTOST SERPL-MCNC: 15 NG/DL (ref 240–950)

## 2018-12-17 LAB — LAB SCANNED RESULT: ABNORMAL

## 2019-03-08 LAB — ESTRADIOL SERPL-MCNC: 438 PG/ML (ref 6–50)

## 2019-03-10 LAB
SHBG SERPL-SCNC: 129 NMOL/L (ref 11–80)
TESTOST FREE SERPL-MCNC: 0 NG/DL (ref 4.7–24.4)
TESTOST SERPL-MCNC: 6 NG/DL (ref 240–950)

## 2019-04-25 ENCOUNTER — MEDICAL CORRESPONDENCE (OUTPATIENT)
Dept: HEALTH INFORMATION MANAGEMENT | Facility: CLINIC | Age: 29
End: 2019-04-25

## 2019-06-12 ENCOUNTER — APPOINTMENT (OUTPATIENT)
Dept: LAB | Facility: CLINIC | Age: 29
End: 2019-06-12
Payer: COMMERCIAL

## 2019-06-12 LAB — ESTRADIOL SERPL-MCNC: 409 PG/ML (ref 6–50)

## 2019-06-13 LAB
SHBG SERPL-SCNC: 148 NMOL/L (ref 11–80)
TESTOST FREE SERPL-MCNC: 0.01 NG/DL (ref 4.7–24.4)
TESTOST SERPL-MCNC: 10 NG/DL (ref 240–950)

## 2019-06-17 LAB — LAB SCANNED RESULT: ABNORMAL

## 2020-03-02 ENCOUNTER — HEALTH MAINTENANCE LETTER (OUTPATIENT)
Age: 30
End: 2020-03-02

## 2020-12-20 ENCOUNTER — HEALTH MAINTENANCE LETTER (OUTPATIENT)
Age: 30
End: 2020-12-20

## 2021-04-24 ENCOUNTER — HEALTH MAINTENANCE LETTER (OUTPATIENT)
Age: 31
End: 2021-04-24

## 2021-10-03 ENCOUNTER — HEALTH MAINTENANCE LETTER (OUTPATIENT)
Age: 31
End: 2021-10-03

## 2022-03-16 ENCOUNTER — LAB (OUTPATIENT)
Dept: LAB | Facility: CLINIC | Age: 32
End: 2022-03-16
Payer: COMMERCIAL

## 2022-03-16 LAB — ESTRADIOL SERPL-MCNC: 473 PG/ML

## 2022-03-16 PROCEDURE — 99000 SPECIMEN HANDLING OFFICE-LAB: CPT | Performed by: PATHOLOGY

## 2022-03-16 PROCEDURE — 84270 ASSAY OF SEX HORMONE GLOBUL: CPT | Mod: 90 | Performed by: PATHOLOGY

## 2022-03-16 PROCEDURE — 36415 COLL VENOUS BLD VENIPUNCTURE: CPT | Performed by: PATHOLOGY

## 2022-03-16 PROCEDURE — 82670 ASSAY OF TOTAL ESTRADIOL: CPT | Mod: 90 | Performed by: PATHOLOGY

## 2022-03-16 PROCEDURE — 84403 ASSAY OF TOTAL TESTOSTERONE: CPT | Mod: 90 | Performed by: PATHOLOGY

## 2022-03-16 PROCEDURE — 82672 ASSAY OF ESTROGEN: CPT | Mod: 90 | Performed by: PATHOLOGY

## 2022-03-17 LAB — SHBG SERPL-SCNC: 150 NMOL/L (ref 11–135)

## 2022-03-18 LAB
TESTOST FREE SERPL-MCNC: 0.05 NG/DL
TESTOST SERPL-MCNC: 9 NG/DL (ref 8–950)

## 2022-04-01 LAB — SCANNED LAB RESULT: ABNORMAL

## 2022-05-15 ENCOUNTER — HEALTH MAINTENANCE LETTER (OUTPATIENT)
Age: 32
End: 2022-05-15

## 2022-09-10 ENCOUNTER — HEALTH MAINTENANCE LETTER (OUTPATIENT)
Age: 32
End: 2022-09-10

## 2023-06-03 ENCOUNTER — HEALTH MAINTENANCE LETTER (OUTPATIENT)
Age: 33
End: 2023-06-03

## 2024-03-14 ENCOUNTER — LAB (OUTPATIENT)
Dept: LAB | Facility: CLINIC | Age: 34
End: 2024-03-14
Payer: COMMERCIAL

## 2024-03-14 DIAGNOSIS — F64.9 GENDER DYSPHORIA: Primary | ICD-10-CM

## 2024-03-14 PROCEDURE — 36415 COLL VENOUS BLD VENIPUNCTURE: CPT | Performed by: PATHOLOGY

## 2024-03-14 PROCEDURE — 82670 ASSAY OF TOTAL ESTRADIOL: CPT

## 2024-03-14 PROCEDURE — 82672 ASSAY OF ESTROGEN: CPT

## 2024-03-14 PROCEDURE — 99000 SPECIMEN HANDLING OFFICE-LAB: CPT | Performed by: PATHOLOGY

## 2024-03-15 LAB — ESTRADIOL SERPL-MCNC: 174 PG/ML

## 2024-03-25 LAB — SCANNED LAB RESULT: ABNORMAL

## 2024-07-07 ENCOUNTER — HEALTH MAINTENANCE LETTER (OUTPATIENT)
Age: 34
End: 2024-07-07

## 2025-07-13 ENCOUNTER — HEALTH MAINTENANCE LETTER (OUTPATIENT)
Age: 35
End: 2025-07-13

## (undated) DEVICE — DRSG STERI STRIP 1/2X4" R1547

## (undated) DEVICE — SU MONOCRYL 4-0 PS-2 18" UND Y496G

## (undated) DEVICE — SU SILK 0 SH 30" K834H

## (undated) DEVICE — PREP CHLORAPREP 26ML TINTED ORANGE  260815

## (undated) DEVICE — DRAIN PENROSE 0.25"X18" LATEX FREE GR201

## (undated) DEVICE — LINEN GOWN X4 5410

## (undated) DEVICE — SOL NACL 0.9% IRRIG 1000ML BOTTLE 2F7124

## (undated) DEVICE — GLOVE PROTEXIS W/NEU-THERA 7.5  2D73TE75

## (undated) DEVICE — DRAPE LAP TRANSVERSE 29421

## (undated) DEVICE — SOL ADH LIQUID BENZOIN SWAB 0.6ML C1544

## (undated) DEVICE — DECANTER VIAL 2006S

## (undated) DEVICE — DRSG PRIMAPORE 02X3" 7133

## (undated) DEVICE — BLADE CLIPPER SGL USE 9680

## (undated) DEVICE — SU CHROMIC 3-0 SH 27" G122H

## (undated) DEVICE — SUCTION MANIFOLD NEPTUNE 2 SYS 1 PORT 702-025-000

## (undated) DEVICE — PACK MINOR CUSTOM ASC

## (undated) DEVICE — LINEN ORTHO PACK 5446

## (undated) DEVICE — BLADE KNIFE SURG 15 371115

## (undated) DEVICE — SYR EAR BULB 3OZ 0035830

## (undated) DEVICE — LINEN TOWEL PACK X5 5464

## (undated) DEVICE — SUCTION TIP YANKAUER W/O VENT K86

## (undated) DEVICE — ESU GROUND PAD ADULT W/CORD E7507

## (undated) DEVICE — SU VICRYL 0 CT-2 27" J334H

## (undated) RX ORDER — MEPERIDINE HYDROCHLORIDE 25 MG/ML
INJECTION INTRAMUSCULAR; INTRAVENOUS; SUBCUTANEOUS
Status: DISPENSED
Start: 2017-03-07

## (undated) RX ORDER — GABAPENTIN 300 MG/1
CAPSULE ORAL
Status: DISPENSED
Start: 2017-03-07

## (undated) RX ORDER — BUPIVACAINE HYDROCHLORIDE AND EPINEPHRINE 2.5; 5 MG/ML; UG/ML
INJECTION, SOLUTION EPIDURAL; INFILTRATION; INTRACAUDAL; PERINEURAL
Status: DISPENSED
Start: 2017-03-07

## (undated) RX ORDER — ACETAMINOPHEN 325 MG/1
TABLET ORAL
Status: DISPENSED
Start: 2017-03-07

## (undated) RX ORDER — BUPIVACAINE HYDROCHLORIDE 5 MG/ML
INJECTION, SOLUTION EPIDURAL; INTRACAUDAL
Status: DISPENSED
Start: 2017-03-07